# Patient Record
Sex: FEMALE | Race: BLACK OR AFRICAN AMERICAN | URBAN - METROPOLITAN AREA
[De-identification: names, ages, dates, MRNs, and addresses within clinical notes are randomized per-mention and may not be internally consistent; named-entity substitution may affect disease eponyms.]

---

## 2020-08-05 ENCOUNTER — OFFICE VISIT (OUTPATIENT)
Dept: URBAN - METROPOLITAN AREA TELEHEALTH 2 | Facility: TELEHEALTH | Age: 70
End: 2020-08-05
Payer: MEDICARE

## 2020-08-05 DIAGNOSIS — R14.0 BLOATING: ICD-10-CM

## 2020-08-05 DIAGNOSIS — R14.3 FLATULENCE: ICD-10-CM

## 2020-08-05 DIAGNOSIS — K21.9 ACID REFLUX: ICD-10-CM

## 2020-08-05 PROCEDURE — G8427 DOCREV CUR MEDS BY ELIG CLIN: HCPCS | Performed by: INTERNAL MEDICINE

## 2020-08-05 PROCEDURE — G9903 PT SCRN TBCO ID AS NON USER: HCPCS | Performed by: INTERNAL MEDICINE

## 2020-08-05 PROCEDURE — 1036F TOBACCO NON-USER: CPT | Performed by: INTERNAL MEDICINE

## 2020-08-05 PROCEDURE — 3017F COLORECTAL CA SCREEN DOC REV: CPT | Performed by: INTERNAL MEDICINE

## 2020-08-05 PROCEDURE — 99204 OFFICE O/P NEW MOD 45 MIN: CPT | Performed by: INTERNAL MEDICINE

## 2020-08-05 PROCEDURE — G8420 CALC BMI NORM PARAMETERS: HCPCS | Performed by: INTERNAL MEDICINE

## 2020-08-05 RX ORDER — METHOCARBAMOL 500 MG/1
1.5 TABLETS TABLET ORAL
Status: DISCONTINUED | COMMUNITY

## 2020-08-05 RX ORDER — OMEPRAZOLE 20 MG/1
1 CAPSULE BEFORE A MEAL CAPSULE, DELAYED RELEASE ORAL ONCE A DAY
Qty: 90 CAPSULE | Refills: 2 | OUTPATIENT
Start: 2020-08-06

## 2020-08-05 RX ORDER — TIZANIDINE 4 MG/1
1 TABLET AS NEEDED TABLET ORAL THREE TIMES A DAY
Status: ACTIVE | COMMUNITY

## 2020-08-05 RX ORDER — GABAPENTIN 100 MG/1
1 CAPSULE CAPSULE ORAL ONCE A DAY
Status: ACTIVE | COMMUNITY

## 2020-08-05 NOTE — HPI-TODAY'S VISIT:
The patient is a 69 year old female who is present via Telephone for a gastroenterology evaluation for gas.  Patient complains of flatulence and "grumbling" stomach noises. She also notes of mild bloating. Patient notes she avoided dairy over the past few years but recently started to eat apple pie, gelato, and added whip cream, but then she stopped after she noticed her symptoms. She reports she stopped Culturelle 3 weeks before she developed gas.  With a diet change, her flatulence and bloating decreased. She restarted Culturelle two days ago. She ordered Align and is waiting for it to arrive to her home. She is feeling better but wants to make sure she is on the right path. Denies abdominal pain. She has 1-2 BM QD. Denies diarrhea, fecal incontinence, or hematochezia.  Patient admits of reflux after eating and in the mornings when she wakes up.   Time spent with patient via Telephone: 17 min

## 2021-04-30 ENCOUNTER — ERX REFILL RESPONSE (OUTPATIENT)
Dept: URBAN - METROPOLITAN AREA CLINIC 92 | Facility: CLINIC | Age: 71
End: 2021-04-30

## 2021-04-30 RX ORDER — OMEPRAZOLE 20 MG/1
1 CAPSULE BEFORE A MEAL CAPSULE, DELAYED RELEASE ORAL ONCE A DAY
Qty: 90 CAPSULE | Refills: 2

## 2022-01-28 ENCOUNTER — TELEPHONE ENCOUNTER (OUTPATIENT)
Dept: URBAN - METROPOLITAN AREA CLINIC 105 | Facility: CLINIC | Age: 72
End: 2022-01-28

## 2022-01-31 ENCOUNTER — DASHBOARD ENCOUNTERS (OUTPATIENT)
Age: 72
End: 2022-01-31

## 2022-02-02 ENCOUNTER — OFFICE VISIT (OUTPATIENT)
Dept: URBAN - METROPOLITAN AREA TELEHEALTH 2 | Facility: TELEHEALTH | Age: 72
End: 2022-02-02
Payer: MEDICARE

## 2022-02-02 DIAGNOSIS — R14.3 FLATULENCE: ICD-10-CM

## 2022-02-02 DIAGNOSIS — R14.0 BLOATING: ICD-10-CM

## 2022-02-02 PROCEDURE — 99213 OFFICE O/P EST LOW 20 MIN: CPT | Performed by: INTERNAL MEDICINE

## 2022-02-02 RX ORDER — TIZANIDINE 4 MG/1
1 TABLET AS NEEDED TABLET ORAL THREE TIMES A DAY
Status: ACTIVE | COMMUNITY

## 2022-02-02 RX ORDER — GABAPENTIN 100 MG/1
1 CAPSULE CAPSULE ORAL ONCE A DAY
Status: ACTIVE | COMMUNITY

## 2022-02-02 RX ORDER — OMEPRAZOLE 20 MG/1
1 CAPSULE BEFORE A MEAL CAPSULE, DELAYED RELEASE ORAL ONCE A DAY
Qty: 90 CAPSULE | Refills: 2 | Status: ACTIVE | COMMUNITY

## 2022-02-02 RX ORDER — OMEPRAZOLE 20 MG/1
1 CAPSULE 30 MINUTES BEFORE MORNING MEAL CAPSULE, DELAYED RELEASE ORAL ONCE A DAY
Qty: 90 | Refills: 0

## 2022-02-02 NOTE — HPI-TODAY'S VISIT:
(Dr. Starr:  August 2020) The patient is a 69 year old female who is present via Telephone for a gastroenterology evaluation for gas.  Patient complains of flatulence and "grumbling" stomach noises. She also notes of mild bloating. Patient notes she avoided dairy over the past few years but recently started to eat apple pie, gelato, and added whip cream, but then she stopped after she noticed her symptoms. She reports she stopped Culturelle 3 weeks before she developed gas.  With a diet change, her flatulence and bloating decreased. She restarted Culturelle two days ago. She ordered Align and is waiting for it to arrive to her home. She is feeling better but wants to make sure she is on the right path. Denies abdominal pain. She has 1-2 BM QD. Denies diarrhea, fecal incontinence, or hematochezia.  Patient admits of reflux after eating and in the mornings when she wakes up.   Today:  February 2, 2022 The patient is here for follow up  of bloating and belching. Her symptoms improved with Omeprazole which she is taking daily.  She denies heartburn, dysphagia, nausea, vomiting, early satiety and weight loss.  Her last colonoscopy 2020 demonstrated diverticular disease.

## 2022-10-03 ENCOUNTER — TELEPHONE ENCOUNTER (OUTPATIENT)
Dept: URBAN - METROPOLITAN AREA CLINIC 17 | Facility: CLINIC | Age: 72
End: 2022-10-03

## 2022-10-03 RX ORDER — OMEPRAZOLE 20 MG/1
1 CAPSULE 30 MINUTES BEFORE MORNING MEAL CAPSULE, DELAYED RELEASE ORAL ONCE A DAY
Qty: 90 | Refills: 1

## 2022-10-27 ENCOUNTER — ERX REFILL RESPONSE (OUTPATIENT)
Dept: URBAN - METROPOLITAN AREA CLINIC 17 | Facility: CLINIC | Age: 72
End: 2022-10-27

## 2022-10-27 RX ORDER — OMEPRAZOLE 20 MG/1
1 CAPSULE 30 MINUTES BEFORE MORNING MEAL CAPSULE, DELAYED RELEASE ORAL ONCE A DAY
Qty: 90 | Refills: 1 | OUTPATIENT

## 2022-10-27 RX ORDER — OMEPRAZOLE 20 MG/1
TAKE 1 CAPSULE BY MOUTH EVERY DAY 30 MINUTES BEFORE BREAKFAST CAPSULE, DELAYED RELEASE ORAL
Qty: 90 CAPSULE | Refills: 0 | OUTPATIENT